# Patient Record
Sex: FEMALE | Race: WHITE | URBAN - METROPOLITAN AREA
[De-identification: names, ages, dates, MRNs, and addresses within clinical notes are randomized per-mention and may not be internally consistent; named-entity substitution may affect disease eponyms.]

---

## 2018-07-30 ENCOUNTER — OFFICE VISIT (OUTPATIENT)
Dept: INTERNAL MEDICINE CLINIC | Age: 53
End: 2018-07-30

## 2018-07-30 VITALS
RESPIRATION RATE: 16 BRPM | SYSTOLIC BLOOD PRESSURE: 101 MMHG | TEMPERATURE: 96.1 F | OXYGEN SATURATION: 100 % | BODY MASS INDEX: 19.77 KG/M2 | HEART RATE: 58 BPM | HEIGHT: 64 IN | WEIGHT: 115.8 LBS | DIASTOLIC BLOOD PRESSURE: 64 MMHG

## 2018-07-30 DIAGNOSIS — R53.83 FATIGUE, UNSPECIFIED TYPE: ICD-10-CM

## 2018-07-30 DIAGNOSIS — F41.9 ANXIETY: ICD-10-CM

## 2018-07-30 DIAGNOSIS — Z00.00 ROUTINE GENERAL MEDICAL EXAMINATION AT A HEALTH CARE FACILITY: Primary | ICD-10-CM

## 2018-07-30 DIAGNOSIS — Z12.11 COLON CANCER SCREENING: ICD-10-CM

## 2018-07-30 DIAGNOSIS — Z13.1 DIABETES MELLITUS SCREENING: ICD-10-CM

## 2018-07-30 RX ORDER — ESCITALOPRAM OXALATE 10 MG/1
10 TABLET ORAL DAILY
Qty: 30 TAB | Refills: 2 | Status: SHIPPED | OUTPATIENT
Start: 2018-07-30

## 2018-07-30 NOTE — MR AVS SNAPSHOT
[address] [place] [address] 
 
 
 [address] [facility] [id number] 
[phone] Patient: Dustin Maldonado MRN: [id number] :[id number] Visit Information Date & Time Provider Department Dept. Phone Encounter #  
 2018  3:00 PM Jackie Meyer MD [facility] [phone] [id number] Follow-up Instructions Return in about 1 month (around 2018) for f/u anxiety. Upcoming Health Maintenance Date Due  
 PAP AKA CERVICAL CYTOLOGY 10/10/2015 BREAST CANCER SCRN MAMMOGRAM 3/23/2016 FOBT Q 1 YEAR AGE 50-75 3/23/2016 DTaP/Tdap/Td series (1 - Tdap) 10/1/2018* Influenza Age 5 to Adult 2018 *Topic was postponed. The date shown is not the original due date. Allergies as of 2018  Review Complete On: 2018 By: Jackie Meyer MD  
 No Known Allergies Current Immunizations  Never Reviewed No immunizations on file. Not reviewed this visit You Were Diagnosed With   
  
 Codes Comments Routine general medical examination at a health care facility    -  Primary ICD-10-CM: Z00.00 ICD-9-CM: V70.0 Colon cancer screening     ICD-10-CM: Z12.11 ICD-9-CM: V76.51 Fatigue, unspecified type     ICD-10-CM: R53.83 ICD-9-CM: 780.79 Diabetes mellitus screening     ICD-10-CM: Z13.1 ICD-9-CM: V77.1 Anxiety     ICD-10-CM: F41.9 ICD-9-CM: 300.00 Vitals BP Pulse Temp Resp Height(growth percentile) Weight(growth percentile) 101/64 (BP 1 Location: Left arm, BP Patient Position: Sitting) (!) 58 96.1 °F (35.6 °C) (Oral) 16 5' 4\" (1.626 m) 115 lb 12.8 oz (52.5 kg) SpO2 BMI OB Status Smoking Status 100% 19.88 kg/m2 Premenopausal Former Smoker Vitals History BMI and BSA Data Body Mass Index Body Surface Area  
 19.88 kg/m 2 1.54 m 2 Preferred Pharmacy Pharmacy Name Phone  [facility] [place] [address] [facility] [phone] Your Updated Medication List  
  
   
This list is accurate as of 7/30/18  3:25 PM.  Always use your most recent med list.  
  
  
  
  
 escitalopram oxalate 10 mg tablet Commonly known as:  Colan Big Take 1 Tab by mouth daily. Prescriptions Sent to Pharmacy Refills  
 escitalopram oxalate (LEXAPRO) 10 mg tablet 2 Sig: Take 1 Tab by mouth daily. Class: Normal  
 Pharmacy: [facility] [address] [place] [address], [address] [place] [address] #: [phone] Route: Oral  
  
We Performed the Following REFERRAL TO GASTROENTEROLOGY [YKC60 Custom] Comments:  
 Please evaluate patient for  Screening colonoscopy in 1-2 weeks. REFERRAL TO PSYCHIATRY [REF91 Custom] Comments:  
 Please evaluate for anxiety in 1 week. Pt will make own appt. Follow-up Instructions Return in about 1 month (around 8/30/2018) for f/u anxiety. To-Do List   
 07/30/2018 Lab:  HEMOGLOBIN A1C WITH EAG   
  
 08/30/2018 Lab:  HEPATIC FUNCTION PANEL   
  
 08/30/2018 Lab:  LIPID PANEL   
  
 08/30/2018 Lab:  TSH 3RD GENERATION   
  
 08/30/2018 Lab:  URINALYSIS W/ RFLX MICROSCOPIC   
  
 08/30/2018 Lab:  VITAMIN B12   
  
 08/30/2018 Lab:  VITAMIN D, 25 HYDROXY Referral Information Referral ID Referred By Referred To  
  
 [id number] [address] [place]   
   [address] [place] [address] [address] [facility] [address] Phone: [phone] Fax: [phone] Visits Status Start Date End Date 1 New Request 7/30/18 7/30/19 If your referral has a status of pending review or denied, additional information will be sent to support the outcome of this decision. Referral ID Referred By Referred To  
 [id number] Norma Pruitt MD  
   [address] [place] [address] [address] [facility] [address] Phone: [phone] Fax: [phone] Visits Status Start Date End Date 1 New Request 7/30/18 7/30/19 If your referral has a status of pending review or denied, additional information will be sent to support the outcome of this decision. Patient Instructions 1) follow-up in 1 month or sooner if worsening symptoms. 2) contact your ob/gyn for the mammogram and pap smear. Introducing [facility]! [facility] introduces [employer] patient portal. Now you can access parts of your medical record, email your doctor's office, and request medication refills online. 1. In your internet browser, go to [email or web address] 2. Click on the First Time User? Click Here link in the Sign In box. You will see the New Member Sign Up page. 3. Enter your [employer] Access Code exactly as it appears below. You will not need to use this code after youve completed the sign-up process. If you do not sign up before the expiration date, you must request a new code. · [employer] Access Code: [email or web address] Expires: 10/28/2018  3:24 PM 
 
4. Enter the last four digits of your Social Security Number (xxxx) and Date of Birth (mm/dd/yyyy) as indicated and click Submit. You will be taken to the next sign-up page. 5. Create a [employer] ID. This will be your [employer] login ID and cannot be changed, so think of one that is secure and easy to remember. 6. Create a [employer] password. You can change your password at any time. 7. Enter your Password Reset Question and Answer. This can be used at a later time if you forget your password. 8. Enter your e-mail address. You will receive e-mail notification when new information is available in [address] 9. Click Sign Up. You can now view and download portions of your medical record. 10. Click the Download Summary menu link to download a portable copy of your medical information.  
 
If you have questions, please visit the Frequently Asked Questions section of the [employer]. Remember, [employer] is NOT to be used for urgent needs. For medical emergencies, dial 911. Now available from your iPhone and Android! Please provide this summary of care documentation to your next provider. Your primary care clinician is listed as Cristina Celeste. If you have any questions after today's visit, please call [phone].
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
## 2018-07-30 NOTE — PROGRESS NOTES
ROOM # 1 Tasha Zapien presents today for Chief Complaint Patient presents with  [facility] Care  
  new patient  Anxiety  
  panic attack f/u from [facility] 2 wks ago Tasha Zapien preferred language for health care discussion is english/other. Is someone accompanying this pt? no 
 
Is the patient using any DME equipment during OV? no 
 
Depression Screening: PHQ over the last two weeks 7/30/2018 7/30/2018 Little interest or pleasure in doing things Not at all Not at all Feeling down, depressed, irritable, or hopeless Not at all Not at all Total Score PHQ 2 0 0 Learning Assessment: 
Learning Assessment 7/30/2018 PRIMARY LEARNER Patient HIGHEST LEVEL OF EDUCATION - PRIMARY LEARNER  2 YEARS OF COLLEGE  
BARRIERS PRIMARY LEARNER NONE  
CO-LEARNER CAREGIVER No  
PRIMARY LANGUAGE ENGLISH  
LEARNER PREFERENCE PRIMARY READING  
ANSWERED BY patient RELATIONSHIP SELF Abuse Screening: 
Abuse Screening Questionnaire 7/30/2018 Do you ever feel afraid of your partner? Craig Kay Are you in a relationship with someone who physically or mentally threatens you? Craig Kay Is it safe for you to go home? Sissy Solo Fall Risk No flowsheet data found. Health Maintenance reviewed and discussed per provider. Yes Tasha Zapien is due for Health Maintenance Due Topic Date Due  
 PAP AKA CERVICAL CYTOLOGY  10/10/2015  BREAST CANCER SCRN MAMMOGRAM  03/23/2016  FOBT Q 1 YEAR AGE 50-75  03/23/2016 Please order/place referral if appropriate. Advance Directive: 1. Do you have an advance directive in place? Patient Reply: no 
 
2. If not, would you like material regarding how to put one in place? Patient Reply: no 
 
Coordination of Care: 1. Have you been to the ER, urgent care clinic since your last visit? Hospitalized since your last visit? [facility] 2. Have you seen or consulted any other health care providers outside of the [address] [place] [address] since your last visit?  Include any pap smears or colon screening.  no

## 2018-07-30 NOTE — PATIENT INSTRUCTIONS
1) follow-up in 1 month or sooner if worsening symptoms. 2) contact your ob/gyn for the mammogram and pap smear.   
 
3) use debrox over the counter for ear wax removal.

## 2018-08-30 DIAGNOSIS — Z00.00 ROUTINE GENERAL MEDICAL EXAMINATION AT A HEALTH CARE FACILITY: ICD-10-CM

## 2018-08-30 DIAGNOSIS — R53.83 FATIGUE, UNSPECIFIED TYPE: ICD-10-CM
